# Patient Record
Sex: FEMALE | Race: WHITE
[De-identification: names, ages, dates, MRNs, and addresses within clinical notes are randomized per-mention and may not be internally consistent; named-entity substitution may affect disease eponyms.]

---

## 2017-02-15 ENCOUNTER — LABORATORY RESULT (OUTPATIENT)
Age: 52
End: 2017-02-15

## 2017-02-16 ENCOUNTER — OTHER (OUTPATIENT)
Age: 52
End: 2017-02-16

## 2017-02-21 LAB
BASOPHILS # BLD AUTO: 0.05 K/UL
BASOPHILS NFR BLD AUTO: 0.9 %
EOSINOPHIL # BLD AUTO: 0.52 K/UL
EOSINOPHIL NFR BLD AUTO: 9.8 %
HCT VFR BLD CALC: 35.7 %
HGB BLD-MCNC: 11.5 G/DL
IMM GRANULOCYTES NFR BLD AUTO: 0.2 %
IRON SERPL-MCNC: 53 UG/DL
LYMPHOCYTES # BLD AUTO: 1.14 K/UL
LYMPHOCYTES NFR BLD AUTO: 21.5 %
MAN DIFF?: NORMAL
MCHC RBC-ENTMCNC: 30.4 PG
MCHC RBC-ENTMCNC: 32.2 GM/DL
MCV RBC AUTO: 94.4 FL
MONOCYTES # BLD AUTO: 0.51 K/UL
MONOCYTES NFR BLD AUTO: 9.6 %
NEUTROPHILS # BLD AUTO: 3.07 K/UL
NEUTROPHILS NFR BLD AUTO: 58 %
PLATELET # BLD AUTO: 254 K/UL
RBC # BLD: 3.78 M/UL
RBC # FLD: 14 %
WBC # FLD AUTO: 5.3 K/UL

## 2017-04-01 ENCOUNTER — RX RENEWAL (OUTPATIENT)
Age: 52
End: 2017-04-01

## 2017-05-03 ENCOUNTER — APPOINTMENT (OUTPATIENT)
Dept: FAMILY MEDICINE | Facility: CLINIC | Age: 52
End: 2017-05-03

## 2017-05-03 VITALS
BODY MASS INDEX: 27.77 KG/M2 | DIASTOLIC BLOOD PRESSURE: 70 MMHG | SYSTOLIC BLOOD PRESSURE: 110 MMHG | HEART RATE: 78 BPM | WEIGHT: 194 LBS | HEIGHT: 70 IN | OXYGEN SATURATION: 98 %

## 2017-05-03 DIAGNOSIS — R63.5 ABNORMAL WEIGHT GAIN: ICD-10-CM

## 2017-05-09 ENCOUNTER — MEDICATION RENEWAL (OUTPATIENT)
Age: 52
End: 2017-05-09

## 2017-05-09 LAB
T3 SERPL-MCNC: 77 NG/DL
T3FREE SERPL-MCNC: 2.13 PG/ML
T4 FREE SERPL-MCNC: 1.2 NG/DL
T4 SERPL-MCNC: 7.5 UG/DL
TSH SERPL-ACNC: 6.71 UIU/ML

## 2017-05-15 ENCOUNTER — RX RENEWAL (OUTPATIENT)
Age: 52
End: 2017-05-15

## 2017-07-18 ENCOUNTER — APPOINTMENT (OUTPATIENT)
Dept: FAMILY MEDICINE | Facility: CLINIC | Age: 52
End: 2017-07-18

## 2017-07-18 VITALS
HEIGHT: 70 IN | BODY MASS INDEX: 27.49 KG/M2 | DIASTOLIC BLOOD PRESSURE: 73 MMHG | SYSTOLIC BLOOD PRESSURE: 110 MMHG | WEIGHT: 192 LBS | HEART RATE: 76 BPM | OXYGEN SATURATION: 98 %

## 2017-07-22 LAB
T3 SERPL-MCNC: 248 NG/DL
T3FREE SERPL-MCNC: 7.08 PG/ML
T4 SERPL-MCNC: 7.2 UG/DL
TSH SERPL-ACNC: 0.01 UIU/ML

## 2017-11-01 ENCOUNTER — RX RENEWAL (OUTPATIENT)
Age: 52
End: 2017-11-01

## 2017-11-02 ENCOUNTER — RX RENEWAL (OUTPATIENT)
Age: 52
End: 2017-11-02

## 2017-11-16 ENCOUNTER — NON-APPOINTMENT (OUTPATIENT)
Age: 52
End: 2017-11-16

## 2017-11-16 ENCOUNTER — APPOINTMENT (OUTPATIENT)
Dept: FAMILY MEDICINE | Facility: CLINIC | Age: 52
End: 2017-11-16
Payer: COMMERCIAL

## 2017-11-16 VITALS
RESPIRATION RATE: 14 BRPM | DIASTOLIC BLOOD PRESSURE: 60 MMHG | HEIGHT: 70 IN | HEART RATE: 69 BPM | BODY MASS INDEX: 27.77 KG/M2 | WEIGHT: 194 LBS | OXYGEN SATURATION: 97 % | SYSTOLIC BLOOD PRESSURE: 110 MMHG

## 2017-11-16 PROCEDURE — 93000 ELECTROCARDIOGRAM COMPLETE: CPT

## 2017-11-16 PROCEDURE — 99396 PREV VISIT EST AGE 40-64: CPT | Mod: 25

## 2017-11-16 PROCEDURE — 36415 COLL VENOUS BLD VENIPUNCTURE: CPT

## 2017-11-16 RX ORDER — LIOTHYRONINE SODIUM 5 UG/1
5 TABLET ORAL
Qty: 24 | Refills: 1 | Status: DISCONTINUED | COMMUNITY
Start: 2017-07-22 | End: 2017-07-24

## 2017-11-16 RX ORDER — LIOTHYRONINE SODIUM 25 UG/1
25 TABLET ORAL
Qty: 66 | Refills: 0 | Status: DISCONTINUED | COMMUNITY
Start: 2017-05-09 | End: 2017-07-24

## 2017-11-21 ENCOUNTER — RESULT REVIEW (OUTPATIENT)
Age: 52
End: 2017-11-21

## 2017-11-21 LAB
ALBUMIN SERPL ELPH-MCNC: 4.1 G/DL
ALP BLD-CCNC: 39 U/L
ALT SERPL-CCNC: 5 U/L
ANION GAP SERPL CALC-SCNC: 19 MMOL/L
APPEARANCE: CLEAR
AST SERPL-CCNC: 15 U/L
BASOPHILS # BLD AUTO: 0.04 K/UL
BASOPHILS NFR BLD AUTO: 0.6 %
BILIRUB SERPL-MCNC: 0.7 MG/DL
BILIRUBIN URINE: NEGATIVE
BLOOD URINE: NEGATIVE
BUN SERPL-MCNC: 16 MG/DL
CALCIUM SERPL-MCNC: 9.7 MG/DL
CHLORIDE SERPL-SCNC: 104 MMOL/L
CHOLEST SERPL-MCNC: 234 MG/DL
CHOLEST/HDLC SERPL: 2.5 RATIO
CO2 SERPL-SCNC: 20 MMOL/L
COLOR: YELLOW
CREAT SERPL-MCNC: 0.97 MG/DL
EOSINOPHIL # BLD AUTO: 0.41 K/UL
EOSINOPHIL NFR BLD AUTO: 6.4 %
GLUCOSE QUALITATIVE U: NEGATIVE MG/DL
GLUCOSE SERPL-MCNC: 85 MG/DL
HCT VFR BLD CALC: 37.2 %
HDLC SERPL-MCNC: 94 MG/DL
HGB BLD-MCNC: 11.8 G/DL
IMM GRANULOCYTES NFR BLD AUTO: 0.2 %
KETONES URINE: NEGATIVE
LDLC SERPL CALC-MCNC: 123 MG/DL
LEUKOCYTE ESTERASE URINE: NEGATIVE
LYMPHOCYTES # BLD AUTO: 1.1 K/UL
LYMPHOCYTES NFR BLD AUTO: 17.1 %
MAN DIFF?: NORMAL
MCHC RBC-ENTMCNC: 30.2 PG
MCHC RBC-ENTMCNC: 31.7 GM/DL
MCV RBC AUTO: 95.1 FL
MONOCYTES # BLD AUTO: 0.41 K/UL
MONOCYTES NFR BLD AUTO: 6.4 %
NEUTROPHILS # BLD AUTO: 4.46 K/UL
NEUTROPHILS NFR BLD AUTO: 69.3 %
NITRITE URINE: NEGATIVE
PH URINE: 5
PLATELET # BLD AUTO: 282 K/UL
POTASSIUM SERPL-SCNC: 4.4 MMOL/L
PROT SERPL-MCNC: 7.3 G/DL
PROTEIN URINE: NEGATIVE MG/DL
RBC # BLD: 3.91 M/UL
RBC # FLD: 14.1 %
SODIUM SERPL-SCNC: 143 MMOL/L
SPECIFIC GRAVITY URINE: 1.02
T3 SERPL-MCNC: 80 NG/DL
T3FREE SERPL-MCNC: 1.94 PG/ML
T4 SERPL-MCNC: 6 UG/DL
TRIGL SERPL-MCNC: 86 MG/DL
TSH SERPL-ACNC: 8.1 UIU/ML
URATE SERPL-MCNC: 4 MG/DL
UROBILINOGEN URINE: NEGATIVE MG/DL
WBC # FLD AUTO: 6.43 K/UL

## 2018-05-31 ENCOUNTER — APPOINTMENT (OUTPATIENT)
Dept: FAMILY MEDICINE | Facility: CLINIC | Age: 53
End: 2018-05-31
Payer: COMMERCIAL

## 2018-05-31 VITALS
DIASTOLIC BLOOD PRESSURE: 68 MMHG | WEIGHT: 183 LBS | RESPIRATION RATE: 16 BRPM | HEART RATE: 62 BPM | TEMPERATURE: 98.3 F | BODY MASS INDEX: 26.2 KG/M2 | HEIGHT: 70 IN | OXYGEN SATURATION: 98 % | SYSTOLIC BLOOD PRESSURE: 116 MMHG

## 2018-05-31 PROCEDURE — 99214 OFFICE O/P EST MOD 30 MIN: CPT | Mod: 25

## 2018-05-31 PROCEDURE — 36415 COLL VENOUS BLD VENIPUNCTURE: CPT

## 2018-05-31 NOTE — PHYSICAL EXAM

## 2018-05-31 NOTE — PLAN
[FreeTextEntry1] : Will start Prednisone after Patch test done.\par Continue Antihistamine\par Biopsy next option

## 2018-05-31 NOTE — HISTORY OF PRESENT ILLNESS
[Rash (Location) ___] : rash on [unfilled] [Moderate] : moderate [___ Weeks ago] :  [unfilled] weeks ago [Sudden] : started suddenly [Constant] : constant [Worsening] : worsening [FreeTextEntry5] : steroid sream [FreeTextEntry8] : seen by Dr Monzon and Dr Hill. thought to be eczema or Rosacea.\par Now they think its the autoimmune. \par treated with Allegra\par Patch testing done, so she could not start steroids.\par Multiples allergies positive, low grade on patch testing\par Wants thyroid retested, to see where levels are.

## 2018-06-04 LAB
T3 SERPL-MCNC: 85 NG/DL
T3FREE SERPL-MCNC: 2.17 PG/ML
T4 SERPL-MCNC: 8.5 UG/DL
THYROGLOB AB SERPL-ACNC: <20 IU/ML
THYROPEROXIDASE AB SERPL IA-ACNC: <10 IU/ML
TSH SERPL-ACNC: 1.49 UIU/ML

## 2018-06-26 LAB — CYTOLOGY CVX/VAG DOC THIN PREP: NORMAL

## 2018-12-12 ENCOUNTER — APPOINTMENT (OUTPATIENT)
Dept: FAMILY MEDICINE | Facility: CLINIC | Age: 53
End: 2018-12-12
Payer: COMMERCIAL

## 2018-12-12 ENCOUNTER — NON-APPOINTMENT (OUTPATIENT)
Age: 53
End: 2018-12-12

## 2018-12-12 VITALS
BODY MASS INDEX: 26.77 KG/M2 | OXYGEN SATURATION: 98 % | HEART RATE: 69 BPM | TEMPERATURE: 98 F | SYSTOLIC BLOOD PRESSURE: 110 MMHG | WEIGHT: 187 LBS | DIASTOLIC BLOOD PRESSURE: 72 MMHG | HEIGHT: 70 IN | RESPIRATION RATE: 16 BRPM

## 2018-12-12 DIAGNOSIS — L71.9 ROSACEA, UNSPECIFIED: ICD-10-CM

## 2018-12-12 DIAGNOSIS — R21 RASH AND OTHER NONSPECIFIC SKIN ERUPTION: ICD-10-CM

## 2018-12-12 DIAGNOSIS — L40.9 PSORIASIS, UNSPECIFIED: ICD-10-CM

## 2018-12-12 PROCEDURE — G0444 DEPRESSION SCREEN ANNUAL: CPT

## 2018-12-12 PROCEDURE — 99396 PREV VISIT EST AGE 40-64: CPT | Mod: 25

## 2018-12-12 PROCEDURE — 93000 ELECTROCARDIOGRAM COMPLETE: CPT

## 2018-12-12 PROCEDURE — 36415 COLL VENOUS BLD VENIPUNCTURE: CPT

## 2018-12-12 NOTE — PHYSICAL EXAM

## 2018-12-12 NOTE — HISTORY OF PRESENT ILLNESS
[FreeTextEntry1] : annual [de-identified] : 53 yr old female here for an annual\par Rash on and off. Work up done by Rheumatologist, allergist \par labs okay\par Bx done including endoscopy was told she has Rosacea, Psoriasis and unexplained urticaris\par She is planning to see an immunologist in may

## 2018-12-12 NOTE — HEALTH RISK ASSESSMENT
[No falls in past year] : Patient reported no falls in the past year [0] : 2) Feeling down, depressed, or hopeless: Not at all (0) [Patient reported colonoscopy was normal] : Patient reported colonoscopy was normal [None] : None [With Family] : lives with family [Employed] : employed [] :  [Sexually Active] : sexually active [High Risk Behavior] : high risk behavior [Fully functional (bathing, dressing, toileting, transferring, walking, feeding)] : Fully functional (bathing, dressing, toileting, transferring, walking, feeding) [Fully functional (using the telephone, shopping, preparing meals, housekeeping, doing laundry, using] : Fully functional and needs no help or supervision to perform IADLs (using the telephone, shopping, preparing meals, housekeeping, doing laundry, using transportation, managing medications and managing finances) [Smoke Detector] : smoke detector [Carbon Monoxide Detector] : carbon monoxide detector [Seat Belt] :  uses seat belt [Sunscreen] : uses sunscreen [Discussed at today's visit] : Advance Directives Discussed at today's visit [] : No [de-identified] : socially [Change in mental status noted] : No change in mental status noted [Reports changes in hearing] : Reports no changes in hearing [Reports changes in vision] : Reports no changes in vision [Reports changes in dental health] : Reports no changes in dental health [TB Exposure] : is not being exposed to tuberculosis [PapSmearDate] : 4/2018 [ColonoscopyDate] : 7/2018 [HepatitisCDate] : 12/2018 [HepatitisCComments] : done by Rheumatologist [FreeTextEntry2] : nurse

## 2018-12-17 LAB
APPEARANCE: CLEAR
BILIRUBIN URINE: NEGATIVE
BLOOD URINE: NEGATIVE
CHOLEST SERPL-MCNC: 236 MG/DL
CHOLEST/HDLC SERPL: 2.5 RATIO
COLOR: YELLOW
GLUCOSE QUALITATIVE U: NEGATIVE MG/DL
HBA1C MFR BLD HPLC: 5.6 %
HDLC SERPL-MCNC: 94 MG/DL
HIV1+2 AB SPEC QL IA.RAPID: NONREACTIVE
KETONES URINE: NEGATIVE
LDLC SERPL CALC-MCNC: 126 MG/DL
LEUKOCYTE ESTERASE URINE: NEGATIVE
NITRITE URINE: NEGATIVE
PH URINE: 6
PROTEIN URINE: NEGATIVE MG/DL
SPECIFIC GRAVITY URINE: 1.01
T4 SERPL-MCNC: 7.3 UG/DL
TRIGL SERPL-MCNC: 78 MG/DL
TSH SERPL-ACNC: 1.53 UIU/ML
URATE SERPL-MCNC: 3.8 MG/DL
UROBILINOGEN URINE: NEGATIVE MG/DL

## 2018-12-22 ENCOUNTER — RX RENEWAL (OUTPATIENT)
Age: 53
End: 2018-12-22

## 2019-01-22 ENCOUNTER — RX RENEWAL (OUTPATIENT)
Age: 54
End: 2019-01-22

## 2019-01-28 ENCOUNTER — RX RENEWAL (OUTPATIENT)
Age: 54
End: 2019-01-28

## 2019-04-01 ENCOUNTER — RX RENEWAL (OUTPATIENT)
Age: 54
End: 2019-04-01

## 2019-06-12 ENCOUNTER — RX RENEWAL (OUTPATIENT)
Age: 54
End: 2019-06-12

## 2019-08-29 ENCOUNTER — RX CHANGE (OUTPATIENT)
Age: 54
End: 2019-08-29

## 2019-11-18 ENCOUNTER — APPOINTMENT (OUTPATIENT)
Dept: FAMILY MEDICINE | Facility: CLINIC | Age: 54
End: 2019-11-18
Payer: COMMERCIAL

## 2019-11-18 VITALS
RESPIRATION RATE: 16 BRPM | SYSTOLIC BLOOD PRESSURE: 112 MMHG | DIASTOLIC BLOOD PRESSURE: 72 MMHG | WEIGHT: 197 LBS | OXYGEN SATURATION: 99 % | BODY MASS INDEX: 28.2 KG/M2 | HEIGHT: 70 IN | HEART RATE: 65 BPM

## 2019-11-18 PROCEDURE — 36415 COLL VENOUS BLD VENIPUNCTURE: CPT

## 2019-11-18 PROCEDURE — G0444 DEPRESSION SCREEN ANNUAL: CPT

## 2019-11-18 PROCEDURE — G0442 ANNUAL ALCOHOL SCREEN 15 MIN: CPT

## 2019-11-18 PROCEDURE — 99396 PREV VISIT EST AGE 40-64: CPT | Mod: 25

## 2019-11-18 RX ORDER — PREDNISONE 5 MG/1
5 TABLET ORAL
Qty: 30 | Refills: 1 | Status: DISCONTINUED | COMMUNITY
Start: 2018-12-12 | End: 2019-11-18

## 2019-11-18 RX ORDER — DUPILUMAB 300 MG/2ML
300 INJECTION, SOLUTION SUBCUTANEOUS
Refills: 0 | Status: ACTIVE | COMMUNITY

## 2019-11-18 NOTE — HEALTH RISK ASSESSMENT
[Yes] : Yes [2 - 4 times a month (2 pts)] : 2-4 times a month (2 points) [1 or 2 (0 pts)] : 1 or 2 (0 points) [Never (0 pts)] : Never (0 points) [No falls in past year] : Patient reported no falls in the past year [0] : 1) Little interest or pleasure doing things: Not at all (0) [Patient reported mammogram was normal] : Patient reported mammogram was normal [Patient reported PAP Smear was normal] : Patient reported PAP Smear was normal [Patient reported colonoscopy was normal] : Patient reported colonoscopy was normal [None] : None [With Family] : lives with family [Employed] : employed [] :  [Feels Safe at Home] : Feels safe at home [Fully functional (bathing, dressing, toileting, transferring, walking, feeding)] : Fully functional (bathing, dressing, toileting, transferring, walking, feeding) [Fully functional (using the telephone, shopping, preparing meals, housekeeping, doing laundry, using] : Fully functional and needs no help or supervision to perform IADLs (using the telephone, shopping, preparing meals, housekeeping, doing laundry, using transportation, managing medications and managing finances) [Smoke Detector] : smoke detector [Carbon Monoxide Detector] : carbon monoxide detector [Seat Belt] :  uses seat belt [Sunscreen] : uses sunscreen [With Patient/Caregiver] : With Patient/Caregiver [Designated Healthcare Proxy] : Designated healthcare proxy [Name: ___] : Health Care Proxy's Name: [unfilled]  [Relationship: ___] : Relationship: [unfilled] [] : No [Audit-CScore] : 1 [[id number]] : 0 [Change in mental status noted] : No change in mental status noted [Language] : denies difficulty with language [Reports changes in hearing] : Reports no changes in hearing [Reports changes in vision] : Reports no changes in vision [TB Exposure] : is not being exposed to tuberculosis [MammogramDate] : 12/2017 [PapSmearDate] : 12/2017 [ColonoscopyDate] : 7/2018 [HIVDate] : 12/2018 [FreeTextEntry2] : RN [AdvancecareDate] : 11/2019

## 2019-11-18 NOTE — HISTORY OF PRESENT ILLNESS
[FreeTextEntry1] : annual [de-identified] : Dermatitis Rash treated with prednisone and IV IGg. 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 Helped a lot\par Seen by 2 immunologist. And dermatologist. Repeat esophagus Bx showed marked improvement. Dr Beckett is the immunologist she is following up on in [facility]

## 2019-11-18 NOTE — ASSESSMENT
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 \par Follow up with Immunologist\par Hypothyroid: check labs\par Has follow up with GYN 12/2019

## 2019-11-18 NOTE — PHYSICAL EXAM
[No Acute Distress] : no acute distress [Well Nourished] : well nourished [Well Developed] : well developed [Normal Sclera/Conjunctiva] : normal sclera/conjunctiva [Well-Appearing] : well-appearing [EOMI] : extraocular movements intact [PERRL] : pupils equal round and reactive to light [Normal Outer Ear/Nose] : the outer ears and nose were normal in appearance [No JVD] : no jugular venous distention [Normal Oropharynx] : the oropharynx was normal [Supple] : supple [No Lymphadenopathy] : no lymphadenopathy [Thyroid Normal, No Nodules] : the thyroid was normal and there were no nodules present [No Respiratory Distress] : no respiratory distress  [No Accessory Muscle Use] : no accessory muscle use [Clear to Auscultation] : lungs were clear to auscultation bilaterally [Normal Rate] : normal rate  [Regular Rhythm] : with a regular rhythm [Normal S1, S2] : normal S1 and S2 [No Murmur] : no murmur heard [No Carotid Bruits] : no carotid bruits [No Abdominal Bruit] : a ~M bruit was not heard ~T in the abdomen [No Varicosities] : no varicosities [No Edema] : there was no peripheral edema [Pedal Pulses Present] : the pedal pulses are present [No Palpable Aorta] : no palpable aorta [No Extremity Clubbing/Cyanosis] : no extremity clubbing/cyanosis [Non Tender] : non-tender [Soft] : abdomen soft [No Masses] : no abdominal mass palpated [No HSM] : no HSM [Non-distended] : non-distended [Normal Anterior Cervical Nodes] : no anterior cervical lymphadenopathy [Normal Bowel Sounds] : normal bowel sounds [Normal Posterior Cervical Nodes] : no posterior cervical lymphadenopathy [No Spinal Tenderness] : no spinal tenderness [No CVA Tenderness] : no CVA  tenderness [No Joint Swelling] : no joint swelling [Normal Gait] : normal gait [Grossly Normal Strength/Tone] : grossly normal strength/tone [Coordination Grossly Intact] : coordination grossly intact [Deep Tendon Reflexes (DTR)] : deep tendon reflexes were 2+ and symmetric [No Focal Deficits] : no focal deficits [Normal Insight/Judgement] : insight and judgment were intact [Normal Affect] : the affect was normal

## 2019-11-21 LAB
ALBUMIN SERPL ELPH-MCNC: 4.6 G/DL
ALP BLD-CCNC: 52 U/L
ALT SERPL-CCNC: 9 U/L
ANION GAP SERPL CALC-SCNC: 13 MMOL/L
APPEARANCE: CLEAR
AST SERPL-CCNC: 16 U/L
BASOPHILS # BLD AUTO: 0.06 K/UL
BASOPHILS NFR BLD AUTO: 0.9 %
BILIRUB SERPL-MCNC: 0.6 MG/DL
BILIRUBIN URINE: NEGATIVE
BLOOD URINE: NEGATIVE
BUN SERPL-MCNC: 18 MG/DL
CALCIUM SERPL-MCNC: 9.6 MG/DL
CHLORIDE SERPL-SCNC: 101 MMOL/L
CHOLEST SERPL-MCNC: 241 MG/DL
CHOLEST/HDLC SERPL: 2.3 RATIO
CO2 SERPL-SCNC: 24 MMOL/L
COLOR: COLORLESS
CREAT SERPL-MCNC: 0.83 MG/DL
EOSINOPHIL # BLD AUTO: 0.55 K/UL
EOSINOPHIL NFR BLD AUTO: 8.5 %
ESTIMATED AVERAGE GLUCOSE: 105 MG/DL
GLUCOSE QUALITATIVE U: NEGATIVE
GLUCOSE SERPL-MCNC: 93 MG/DL
HBA1C MFR BLD HPLC: 5.3 %
HCT VFR BLD CALC: 40.9 %
HDLC SERPL-MCNC: 103 MG/DL
HGB BLD-MCNC: 12.9 G/DL
IMM GRANULOCYTES NFR BLD AUTO: 0.2 %
KETONES URINE: NEGATIVE
LDLC SERPL CALC-MCNC: 114 MG/DL
LEUKOCYTE ESTERASE URINE: NEGATIVE
LYMPHOCYTES # BLD AUTO: 1.2 K/UL
LYMPHOCYTES NFR BLD AUTO: 18.5 %
MAN DIFF?: NORMAL
MCHC RBC-ENTMCNC: 30.6 PG
MCHC RBC-ENTMCNC: 31.5 GM/DL
MCV RBC AUTO: 96.9 FL
MONOCYTES # BLD AUTO: 0.37 K/UL
MONOCYTES NFR BLD AUTO: 5.7 %
NEUTROPHILS # BLD AUTO: 4.28 K/UL
NEUTROPHILS NFR BLD AUTO: 66.2 %
NITRITE URINE: NEGATIVE
PH URINE: 6
PLATELET # BLD AUTO: 254 K/UL
POTASSIUM SERPL-SCNC: 4.4 MMOL/L
PROT SERPL-MCNC: 7 G/DL
PROTEIN URINE: NEGATIVE
RBC # BLD: 4.22 M/UL
RBC # FLD: 13.1 %
SODIUM SERPL-SCNC: 138 MMOL/L
SPECIFIC GRAVITY URINE: 1.01
T4 SERPL-MCNC: 6 UG/DL
TRIGL SERPL-MCNC: 120 MG/DL
TSH SERPL-ACNC: 7.98 UIU/ML
URATE SERPL-MCNC: 3.6 MG/DL
UROBILINOGEN URINE: NORMAL
WBC # FLD AUTO: 6.47 K/UL

## 2019-11-22 RX ORDER — BUDESONIDE AND FORMOTEROL FUMARATE DIHYDRATE 160; 4.5 UG/1; UG/1
160-4.5 AEROSOL RESPIRATORY (INHALATION) TWICE DAILY
Qty: 3 | Refills: 1 | Status: ACTIVE | COMMUNITY
Start: 2017-05-15 | End: 1900-01-01

## 2020-04-09 ENCOUNTER — TRANSCRIPTION ENCOUNTER (OUTPATIENT)
Age: 55
End: 2020-04-09

## 2020-04-10 ENCOUNTER — TRANSCRIPTION ENCOUNTER (OUTPATIENT)
Age: 55
End: 2020-04-10

## 2020-06-17 ENCOUNTER — APPOINTMENT (OUTPATIENT)
Dept: FAMILY MEDICINE | Facility: CLINIC | Age: 55
End: 2020-06-17
Payer: COMMERCIAL

## 2020-06-17 DIAGNOSIS — L50.8 OTHER URTICARIA: ICD-10-CM

## 2020-06-17 PROCEDURE — 99213 OFFICE O/P EST LOW 20 MIN: CPT | Mod: 95

## 2020-06-17 NOTE — PHYSICAL EXAM
[Normal] : no acute distress, well nourished, well developed and well-appearing [Normal Sclera/Conjunctiva] : normal sclera/conjunctiva [Normal Affect] : the affect was normal

## 2020-06-17 NOTE — HISTORY OF PRESENT ILLNESS
[Other Location: e.g. School (Enter Location, City,State)___] : at [unfilled], at the time of the visit. [Medical Office: ([facility])___] : at the medical office located in  [Verbal consent obtained from patient] : the patient, [unfilled] [FreeTextEntry1] : follow up [de-identified] : Hypothyroid \par Doing well on medications\par Had labs done 3/2020\par Dermatitis Rash treated with prednisone and IV IGg. 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 Helped a lot\par Seen by 2 immunologist. And dermatologist. Repeat esophagus Bx showed marked improvement. Dr Beckett is the immunologist she is following up on in [place]

## 2020-06-17 NOTE — ASSESSMENT
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 \par Follow up with Immunologist\par Hypothyroid:  labs done 3/20\par Schedule physical 11/20

## 2020-09-22 ENCOUNTER — TRANSCRIPTION ENCOUNTER (OUTPATIENT)
Age: 55
End: 2020-09-22

## 2020-09-22 RX ORDER — ALBUTEROL SULFATE 90 UG/1
108 (90 BASE) AEROSOL, METERED RESPIRATORY (INHALATION)
Qty: 3 | Refills: 1 | Status: ACTIVE | COMMUNITY
Start: 2019-08-29 | End: 1900-01-01

## 2020-12-01 ENCOUNTER — NON-APPOINTMENT (OUTPATIENT)
Age: 55
End: 2020-12-01

## 2020-12-01 ENCOUNTER — LABORATORY RESULT (OUTPATIENT)
Age: 55
End: 2020-12-01

## 2020-12-01 ENCOUNTER — APPOINTMENT (OUTPATIENT)
Dept: FAMILY MEDICINE | Facility: CLINIC | Age: 55
End: 2020-12-01
Payer: COMMERCIAL

## 2020-12-01 VITALS
DIASTOLIC BLOOD PRESSURE: 78 MMHG | HEIGHT: 70 IN | BODY MASS INDEX: 29.06 KG/M2 | TEMPERATURE: 98.2 F | WEIGHT: 203 LBS | HEART RATE: 70 BPM | SYSTOLIC BLOOD PRESSURE: 114 MMHG | OXYGEN SATURATION: 98 % | RESPIRATION RATE: 15 BRPM

## 2020-12-01 DIAGNOSIS — R29.898 OTHER SYMPTOMS AND SIGNS INVOLVING THE MUSCULOSKELETAL SYSTEM: ICD-10-CM

## 2020-12-01 DIAGNOSIS — Z00.00 ENCOUNTER FOR GENERAL ADULT MEDICAL EXAMINATION W/OUT ABNORMAL FINDINGS: ICD-10-CM

## 2020-12-01 DIAGNOSIS — M54.9 DORSALGIA, UNSPECIFIED: ICD-10-CM

## 2020-12-01 PROCEDURE — 99072 ADDL SUPL MATRL&STAF TM PHE: CPT

## 2020-12-01 PROCEDURE — G0444 DEPRESSION SCREEN ANNUAL: CPT

## 2020-12-01 PROCEDURE — 36415 COLL VENOUS BLD VENIPUNCTURE: CPT

## 2020-12-01 PROCEDURE — 99213 OFFICE O/P EST LOW 20 MIN: CPT | Mod: 25

## 2020-12-01 PROCEDURE — 93000 ELECTROCARDIOGRAM COMPLETE: CPT | Mod: 59

## 2020-12-01 PROCEDURE — 99396 PREV VISIT EST AGE 40-64: CPT | Mod: 25

## 2020-12-01 PROCEDURE — G0442 ANNUAL ALCOHOL SCREEN 15 MIN: CPT

## 2020-12-01 NOTE — ASSESSMENT
[FreeTextEntry1] : Annual\par Mammogram due, scheduled\par Pap UTD\par Colonoscopy UTD\par Had endoscopy recently for Eosinophilia\par Will get Flu vaccine at work\par \par Weakness lower extremity\par Back pain\par MRI 2017 had mild stenosis, disc herniations mild with no compression\par PT Rx given\par Advised to exercise. moving less. Works in research\par If not improved with PT. 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 \par Follow up with Immunologist\par Hypothyroid:  labs done 3/20\par Seen by Immunologist recently.\par Monitoring Eosinophils\par \par Hypothyroid \par Doing well on medications\par Had labs done 3/2020\par Dermatitis Rash treated with prednisone and IV IGg. 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 Helped a lot\par \par Asthma: seen by Pulmonary. Dr Birch\par PFTs was slightly low started on Spiriva for a few months.\par is scheduled to get repeat PFTs\par \par Seen by 2 immunologist. And dermatologist. Repeat esophagus Bx showed marked improvement. Dr Beckett is the immunologist she is following up on in [place]

## 2020-12-01 NOTE — HEALTH RISK ASSESSMENT
[Good] : ~his/her~  mood as  good [Yes] : Yes [2 - 4 times a month (2 pts)] : 2-4 times a month (2 points) [1 or 2 (0 pts)] : 1 or 2 (0 points) [Never (0 pts)] : Never (0 points) [No] : In the past 12 months have you used drugs other than those required for medical reasons? No [0] : 2) Feeling down, depressed, or hopeless: Not at all (0) [Patient reported mammogram was normal] : Patient reported mammogram was normal [Patient reported PAP Smear was normal] : Patient reported PAP Smear was normal [Patient reported colonoscopy was normal] : Patient reported colonoscopy was normal [None] : None [With Family] : lives with family [Employed] : employed [] :  [Sexually Active] : sexually active [Feels Safe at Home] : Feels safe at home [Reports changes in dental health] : Reports changes in dental health [Smoke Detector] : smoke detector [Carbon Monoxide Detector] : carbon monoxide detector [Seat Belt] :  uses seat belt [Sunscreen] : uses sunscreen [With Patient/Caregiver] : With Patient/Caregiver [Designated Healthcare Proxy] : Designated healthcare proxy [Relationship: ___] : Relationship: [unfilled] [] : No [Audit-CScore] : 2 [[id number]] : 0 [High Risk Behavior] : no high risk behavior [Reports changes in hearing] : Reports no changes in hearing [Reports changes in vision] : Reports no changes in vision [TB Exposure] : is not being exposed to tuberculosis [MammogramDate] : 4/2018 [MammogramComments] : scheduled 1/21 [PapSmearDate] : 12/2019 [ColonoscopyDate] : 12/2018 [ColonoscopyComments] : 9/20 Endoscopy no eosinophilluia [AdvancecareDate] : 12/2020

## 2020-12-01 NOTE — HISTORY OF PRESENT ILLNESS
[FreeTextEntry1] : annual [de-identified] : Ms. VARGHESE TEJADA is a 55 year old female presenting for an annual\par for 2 months feels weakness in both legs. Left worse than right.\par More when walking upstairs\par \par Seen by Immunologist recently.\par Monitoring Eosinophils\par Hypothyroid \par Doing well on medications\par Had labs done 3/2020\par Dermatitis Rash treated with prednisone and IV IGg. 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 Helped a lot\par \par Asthma: seen by Pulmonary. Dr Birch\par PFTs was slightly low started on Spiriva for a few months.\par is scheduled to get repeat PFTs\par \par Seen by 2 immunologist. And dermatologist. Repeat esophagus Bx showed marked improvement. Dr Beckett is the immunologist she is following up on in [facility]

## 2020-12-07 ENCOUNTER — TRANSCRIPTION ENCOUNTER (OUTPATIENT)
Age: 55
End: 2020-12-07

## 2020-12-07 LAB
25(OH)D3 SERPL-MCNC: 26.3 NG/ML
ALBUMIN SERPL ELPH-MCNC: 4.5 G/DL
ALP BLD-CCNC: 76 U/L
ALT SERPL-CCNC: 9 U/L
ANION GAP SERPL CALC-SCNC: 12 MMOL/L
APPEARANCE: ABNORMAL
AST SERPL-CCNC: 16 U/L
BASOPHILS # BLD AUTO: 0.06 K/UL
BASOPHILS NFR BLD AUTO: 1.2 %
BILIRUB SERPL-MCNC: 0.8 MG/DL
BILIRUBIN URINE: NEGATIVE
BLOOD URINE: NEGATIVE
BUN SERPL-MCNC: 19 MG/DL
CALCIUM SERPL-MCNC: 9.7 MG/DL
CHLORIDE SERPL-SCNC: 103 MMOL/L
CHOLEST SERPL-MCNC: 271 MG/DL
CO2 SERPL-SCNC: 25 MMOL/L
COLOR: YELLOW
CREAT SERPL-MCNC: 0.87 MG/DL
EOSINOPHIL # BLD AUTO: 0.31 K/UL
EOSINOPHIL NFR BLD AUTO: 6 %
ESTIMATED AVERAGE GLUCOSE: 108 MG/DL
GLUCOSE QUALITATIVE U: NEGATIVE
GLUCOSE SERPL-MCNC: 98 MG/DL
HBA1C MFR BLD HPLC: 5.4 %
HCT VFR BLD CALC: 40.5 %
HDLC SERPL-MCNC: 98 MG/DL
HGB BLD-MCNC: 12.4 G/DL
IMM GRANULOCYTES NFR BLD AUTO: 0.2 %
KETONES URINE: NEGATIVE
LDLC SERPL CALC-MCNC: 154 MG/DL
LEUKOCYTE ESTERASE URINE: ABNORMAL
LYMPHOCYTES # BLD AUTO: 1.15 K/UL
LYMPHOCYTES NFR BLD AUTO: 22.3 %
MAN DIFF?: NORMAL
MCHC RBC-ENTMCNC: 30.1 PG
MCHC RBC-ENTMCNC: 30.6 GM/DL
MCV RBC AUTO: 98.3 FL
MONOCYTES # BLD AUTO: 0.42 K/UL
MONOCYTES NFR BLD AUTO: 8.1 %
NEUTROPHILS # BLD AUTO: 3.21 K/UL
NEUTROPHILS NFR BLD AUTO: 62.2 %
NITRITE URINE: NEGATIVE
NONHDLC SERPL-MCNC: 173 MG/DL
PH URINE: 6.5
PLATELET # BLD AUTO: 278 K/UL
POTASSIUM SERPL-SCNC: 5.1 MMOL/L
PROT SERPL-MCNC: 7 G/DL
PROTEIN URINE: NEGATIVE
RBC # BLD: 4.12 M/UL
RBC # FLD: 13 %
SODIUM SERPL-SCNC: 140 MMOL/L
SPECIFIC GRAVITY URINE: 1.02
TRIGL SERPL-MCNC: 96 MG/DL
TSH SERPL-ACNC: 1.61 UIU/ML
URATE SERPL-MCNC: 4.3 MG/DL
UROBILINOGEN URINE: NORMAL
WBC # FLD AUTO: 5.16 K/UL

## 2020-12-15 ENCOUNTER — TRANSCRIPTION ENCOUNTER (OUTPATIENT)
Age: 55
End: 2020-12-15

## 2020-12-18 ENCOUNTER — RX RENEWAL (OUTPATIENT)
Age: 55
End: 2020-12-18

## 2021-02-16 ENCOUNTER — RX CHANGE (OUTPATIENT)
Age: 56
End: 2021-02-16

## 2021-02-18 ENCOUNTER — RX CHANGE (OUTPATIENT)
Age: 56
End: 2021-02-18

## 2021-04-07 ENCOUNTER — TRANSCRIPTION ENCOUNTER (OUTPATIENT)
Age: 56
End: 2021-04-07

## 2021-06-28 ENCOUNTER — RX RENEWAL (OUTPATIENT)
Age: 56
End: 2021-06-28

## 2021-06-29 ENCOUNTER — RX RENEWAL (OUTPATIENT)
Age: 56
End: 2021-06-29

## 2021-06-29 ENCOUNTER — TRANSCRIPTION ENCOUNTER (OUTPATIENT)
Age: 56
End: 2021-06-29

## 2021-07-01 ENCOUNTER — TRANSCRIPTION ENCOUNTER (OUTPATIENT)
Age: 56
End: 2021-07-01

## 2021-07-14 ENCOUNTER — TRANSCRIPTION ENCOUNTER (OUTPATIENT)
Age: 56
End: 2021-07-14

## 2021-07-27 ENCOUNTER — TRANSCRIPTION ENCOUNTER (OUTPATIENT)
Age: 56
End: 2021-07-27

## 2021-08-02 ENCOUNTER — TRANSCRIPTION ENCOUNTER (OUTPATIENT)
Age: 56
End: 2021-08-02

## 2021-08-04 ENCOUNTER — TRANSCRIPTION ENCOUNTER (OUTPATIENT)
Age: 56
End: 2021-08-04

## 2021-08-18 ENCOUNTER — TRANSCRIPTION ENCOUNTER (OUTPATIENT)
Age: 56
End: 2021-08-18

## 2021-08-24 ENCOUNTER — APPOINTMENT (OUTPATIENT)
Dept: FAMILY MEDICINE | Facility: CLINIC | Age: 56
End: 2021-08-24
Payer: COMMERCIAL

## 2021-08-24 VITALS
HEIGHT: 70 IN | SYSTOLIC BLOOD PRESSURE: 142 MMHG | BODY MASS INDEX: 28.77 KG/M2 | HEART RATE: 66 BPM | DIASTOLIC BLOOD PRESSURE: 92 MMHG | WEIGHT: 201 LBS | RESPIRATION RATE: 16 BRPM | OXYGEN SATURATION: 98 % | TEMPERATURE: 97.4 F

## 2021-08-24 DIAGNOSIS — D72.119 HYPEREOSINOPHILIC SYNDROME [HES], UNSPECIFIED: ICD-10-CM

## 2021-08-24 DIAGNOSIS — R20.2 PARESTHESIA OF SKIN: ICD-10-CM

## 2021-08-24 DIAGNOSIS — E03.9 HYPOTHYROIDISM, UNSPECIFIED: ICD-10-CM

## 2021-08-24 DIAGNOSIS — E78.00 PURE HYPERCHOLESTEROLEMIA, UNSPECIFIED: ICD-10-CM

## 2021-08-24 DIAGNOSIS — J45.909 UNSPECIFIED ASTHMA, UNCOMPLICATED: ICD-10-CM

## 2021-08-24 DIAGNOSIS — R73.09 OTHER ABNORMAL GLUCOSE: ICD-10-CM

## 2021-08-24 DIAGNOSIS — D64.9 ANEMIA, UNSPECIFIED: ICD-10-CM

## 2021-08-24 PROCEDURE — 99214 OFFICE O/P EST MOD 30 MIN: CPT | Mod: 25

## 2021-08-24 PROCEDURE — 36415 COLL VENOUS BLD VENIPUNCTURE: CPT

## 2021-08-24 RX ORDER — FEXOFENADINE HYDROCHLORIDE 180 MG/1
180 TABLET, FILM COATED ORAL
Refills: 0 | Status: COMPLETED | COMMUNITY
End: 2021-06-24

## 2021-08-24 RX ORDER — ALBUTEROL SULFATE 90 UG/1
108 (90 BASE) AEROSOL, METERED RESPIRATORY (INHALATION)
Qty: 3 | Refills: 1 | Status: COMPLETED | COMMUNITY
Start: 2019-06-12 | End: 2021-08-24

## 2021-08-24 RX ORDER — TIOTROPIUM BROMIDE INHALATION SPRAY 1.56 UG/1
1.25 SPRAY, METERED RESPIRATORY (INHALATION)
Refills: 0 | Status: ACTIVE | COMMUNITY

## 2021-08-24 RX ORDER — LIOTHYRONINE SODIUM 5 UG/1
5 TABLET ORAL
Qty: 180 | Refills: 1 | Status: COMPLETED | COMMUNITY
Start: 2020-03-18 | End: 2020-12-24

## 2021-08-24 NOTE — ASSESSMENT
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 \par Follow up with Immunologist\par Hypothyroid:  labs done 3/20\par Seen by Immunologist recently.\par Monitoring Eosinophils\par \par Hypothyroid \par Doing well on medications\par Had labs done 3/2020\par Dermatitis Rash treated with prednisone and IV IGg. 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 Helped a lot\par \par Asthma: seen by Pulmonary. Dr Birch\par PFTs was slightly low started on Spiriva for a few months.\par is scheduled to get repeat PFTs\par Pulmonary nodule had f/u with pulmonary. CT showed stable nodule.\par \par Hypereosinophilic, doing well on Dupixent\par Seen by 2 immunologist. And dermatologist. Repeat esophagus Bx showed marked improvement. \par Dr Beckett is the immunologist she is following up on in [place]

## 2021-08-30 ENCOUNTER — TRANSCRIPTION ENCOUNTER (OUTPATIENT)
Age: 56
End: 2021-08-30

## 2021-08-30 LAB
ALBUMIN SERPL ELPH-MCNC: 4.7 G/DL
ALP BLD-CCNC: 68 U/L
ALT SERPL-CCNC: 9 U/L
ANION GAP SERPL CALC-SCNC: 13 MMOL/L
AST SERPL-CCNC: 16 U/L
BASOPHILS # BLD AUTO: 0.05 K/UL
BASOPHILS NFR BLD AUTO: 0.9 %
BILIRUB SERPL-MCNC: 0.7 MG/DL
BUN SERPL-MCNC: 19 MG/DL
CALCIUM SERPL-MCNC: 9.4 MG/DL
CHLORIDE SERPL-SCNC: 103 MMOL/L
CHOLEST SERPL-MCNC: 269 MG/DL
CO2 SERPL-SCNC: 25 MMOL/L
CREAT SERPL-MCNC: 0.88 MG/DL
EOSINOPHIL # BLD AUTO: 0.29 K/UL
EOSINOPHIL NFR BLD AUTO: 5.3 %
ESTIMATED AVERAGE GLUCOSE: 108 MG/DL
GLUCOSE SERPL-MCNC: 87 MG/DL
HBA1C MFR BLD HPLC: 5.4 %
HCT VFR BLD CALC: 42.1 %
HDLC SERPL-MCNC: 92 MG/DL
HGB BLD-MCNC: 13.1 G/DL
IMM GRANULOCYTES NFR BLD AUTO: 0.2 %
LDLC SERPL CALC-MCNC: 154 MG/DL
LYMPHOCYTES # BLD AUTO: 1.15 K/UL
LYMPHOCYTES NFR BLD AUTO: 21.1 %
MAN DIFF?: NORMAL
MCHC RBC-ENTMCNC: 30.9 PG
MCHC RBC-ENTMCNC: 31.1 GM/DL
MCV RBC AUTO: 99.3 FL
MONOCYTES # BLD AUTO: 0.41 K/UL
MONOCYTES NFR BLD AUTO: 7.5 %
NEUTROPHILS # BLD AUTO: 3.55 K/UL
NEUTROPHILS NFR BLD AUTO: 65 %
NONHDLC SERPL-MCNC: 177 MG/DL
PLATELET # BLD AUTO: 263 K/UL
POTASSIUM SERPL-SCNC: 4.9 MMOL/L
PROT SERPL-MCNC: 7.1 G/DL
RBC # BLD: 4.24 M/UL
RBC # FLD: 13.7 %
SODIUM SERPL-SCNC: 141 MMOL/L
T4 FREE SERPL-MCNC: 1.3 NG/DL
TRIGL SERPL-MCNC: 115 MG/DL
TSH SERPL-ACNC: 4.51 UIU/ML
WBC # FLD AUTO: 5.46 K/UL

## 2021-11-23 ENCOUNTER — TRANSCRIPTION ENCOUNTER (OUTPATIENT)
Age: 56
End: 2021-11-23

## 2022-02-07 ENCOUNTER — RX RENEWAL (OUTPATIENT)
Age: 57
End: 2022-02-07

## 2022-12-02 ENCOUNTER — OFFICE (OUTPATIENT)
Dept: URBAN - METROPOLITAN AREA CLINIC 12 | Facility: CLINIC | Age: 57
Setting detail: OPHTHALMOLOGY
End: 2022-12-02
Payer: COMMERCIAL

## 2022-12-02 VITALS — HEIGHT: 55 IN

## 2022-12-02 DIAGNOSIS — H04.123: ICD-10-CM

## 2022-12-02 DIAGNOSIS — H43.393: ICD-10-CM

## 2022-12-02 DIAGNOSIS — G43.009: ICD-10-CM

## 2022-12-02 DIAGNOSIS — H52.13: ICD-10-CM

## 2022-12-02 PROCEDURE — 92014 COMPRE OPH EXAM EST PT 1/>: CPT | Performed by: OPHTHALMOLOGY

## 2022-12-02 ASSESSMENT — REFRACTION_MANIFEST
OS_VA1: 20/20-2
OD_VA1: 20/20-
OD_CYLINDER: -0.75
OD_AXIS: 075
OS_SPHERE: -6.75
OD_AXIS: 090
OD_SPHERE: -7.00
OD_SPHERE: -7.25
OD_ADD: +1.75
OS_AXIS: 090
OD_CYLINDER: -0.75
OS_VA1: 20/20
OD_VA1: 20/20
OU_VA: 20/20
OS_AXIS: 100
OS_CYLINDER: -1.00
OS_ADD: +1.75
OS_CYLINDER: -1.25
OS_SPHERE: -7.00

## 2022-12-02 ASSESSMENT — REFRACTION_AUTOREFRACTION
OD_AXIS: 121
OS_AXIS: 020
OD_SPHERE: PLANO
OS_CYLINDER: -0.50
OD_CYLINDER: -0.50
OS_SPHERE: PLANO

## 2022-12-02 ASSESSMENT — KERATOMETRY
OS_K2POWER_DIOPTERS: 39.00
OS_K1POWER_DIOPTERS: 38.50
OD_K2POWER_DIOPTERS: 39.00
OD_AXISANGLE_DEGREES: 090
OS_AXISANGLE_DEGREES: 112
OD_K1POWER_DIOPTERS: 39.00

## 2022-12-02 ASSESSMENT — SPHEQUIV_DERIVED
OD_SPHEQUIV: -7.375
OS_SPHEQUIV: -7.375
OS_SPHEQUIV: -7.5
OD_SPHEQUIV: -7.625

## 2022-12-02 ASSESSMENT — AXIALLENGTH_DERIVED
OD_AL: 29.34
OS_AL: 29.33
OS_AL: 29.41
OD_AL: 29.19

## 2022-12-02 ASSESSMENT — REFRACTION_CURRENTRX
OS_SPHERE: +1.50
OD_OVR_VA: 20/
OS_OVR_VA: 20/
OD_VPRISM_DIRECTION: SV
OD_SPHERE: +1.50
OS_VPRISM_DIRECTION: SV

## 2022-12-02 ASSESSMENT — CONFRONTATIONAL VISUAL FIELD TEST (CVF)
OD_FINDINGS: FULL
OS_FINDINGS: FULL

## 2022-12-02 ASSESSMENT — SUPERFICIAL PUNCTATE KERATITIS (SPK)
OD_SPK: 1+
OS_SPK: 1+

## 2022-12-02 ASSESSMENT — TONOMETRY: OS_IOP_MMHG: 10

## 2022-12-02 ASSESSMENT — VISUAL ACUITY
OS_BCVA: 20/25+3
OD_BCVA: 20/20-1

## 2024-02-09 ENCOUNTER — OFFICE (OUTPATIENT)
Dept: URBAN - METROPOLITAN AREA CLINIC 12 | Facility: CLINIC | Age: 59
Setting detail: OPHTHALMOLOGY
End: 2024-02-09
Payer: COMMERCIAL

## 2024-02-09 DIAGNOSIS — H43.393: ICD-10-CM

## 2024-02-09 DIAGNOSIS — G43.009: ICD-10-CM

## 2024-02-09 DIAGNOSIS — H43.823: ICD-10-CM

## 2024-02-09 DIAGNOSIS — H04.123: ICD-10-CM

## 2024-02-09 DIAGNOSIS — H25.13: ICD-10-CM

## 2024-02-09 DIAGNOSIS — H52.13: ICD-10-CM

## 2024-02-09 PROCEDURE — 92134 CPTRZ OPH DX IMG PST SGM RTA: CPT | Performed by: OPHTHALMOLOGY

## 2024-02-09 PROCEDURE — 92014 COMPRE OPH EXAM EST PT 1/>: CPT | Performed by: OPHTHALMOLOGY

## 2024-02-09 ASSESSMENT — REFRACTION_AUTOREFRACTION
OD_AXIS: 137
OD_CYLINDER: -0.25
OS_SPHERE: +0.25
OS_CYLINDER: -0.50
OS_AXIS: 023
OD_SPHERE: +0.25

## 2024-02-09 ASSESSMENT — CONFRONTATIONAL VISUAL FIELD TEST (CVF)
OD_FINDINGS: FULL
OS_FINDINGS: FULL

## 2024-02-09 ASSESSMENT — REFRACTION_MANIFEST
OS_CYLINDER: -1.25
OD_SPHERE: -7.00
OD_AXIS: 075
OD_VA1: 20/20-
OS_VA1: 20/20-2
OS_AXIS: 090
OD_CYLINDER: -0.75
OS_SPHERE: -6.75

## 2024-02-09 ASSESSMENT — SPHEQUIV_DERIVED
OD_SPHEQUIV: -7.375
OD_SPHEQUIV: 0.125
OS_SPHEQUIV: 0
OS_SPHEQUIV: -7.375

## 2024-02-09 ASSESSMENT — REFRACTION_CURRENTRX
OD_VPRISM_DIRECTION: SV
OD_SPHERE: +1.50
OS_VPRISM_DIRECTION: SV
OD_OVR_VA: 20/
OS_SPHERE: +1.50
OS_OVR_VA: 20/

## 2024-02-09 ASSESSMENT — SUPERFICIAL PUNCTATE KERATITIS (SPK)
OS_SPK: 1+
OD_SPK: 1+

## 2025-07-18 NOTE — HISTORY OF PRESENT ILLNESS
[FreeTextEntry1] : US LUE June 2025: No evidence of LUE DVT [FreeTextEntry1] : follow up [de-identified] : Ms. VARGHESE TEJADA is a 55 year old female presenting for a follow up\par vibration sense right more than left for a couple of weeks.\par Exercises, yoga and walking\par Denies weakness.\par \par Seen by Immunologist recently.\par Monitoring Eosinophils\par Hypothyroid \par Doing well on medications\par Had labs done 3/2020\par Dermatitis Rash treated with prednisone and IV IGg. 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 Helped a lot\par \par Asthma: seen by Pulmonary. Dr Birch\par PFTs was slightly low started on Spiriva for a few months.\par is scheduled to get repeat PFTs\par Pulmonary nodule had f/u with pulmonary. CT showed stable nodule.\par Hypereosinophilic, doing well on Dupixent\par Seen by 2 immunologist. And dermatologist. Repeat esophagus Bx showed marked improvement. Dr Beckett is the immunologist she is following up on in [place]